# Patient Record
Sex: MALE | NOT HISPANIC OR LATINO | URBAN - METROPOLITAN AREA
[De-identification: names, ages, dates, MRNs, and addresses within clinical notes are randomized per-mention and may not be internally consistent; named-entity substitution may affect disease eponyms.]

---

## 2024-01-10 ENCOUNTER — OFFICE VISIT (OUTPATIENT)
Dept: URGENT CARE | Facility: CLINIC | Age: 39
End: 2024-01-10
Payer: COMMERCIAL

## 2024-01-10 VITALS
RESPIRATION RATE: 16 BRPM | HEART RATE: 102 BPM | WEIGHT: 240 LBS | OXYGEN SATURATION: 97 % | DIASTOLIC BLOOD PRESSURE: 78 MMHG | SYSTOLIC BLOOD PRESSURE: 118 MMHG | BODY MASS INDEX: 30.81 KG/M2 | TEMPERATURE: 98.2 F

## 2024-01-10 DIAGNOSIS — B34.9 VIRAL ILLNESS: ICD-10-CM

## 2024-01-10 DIAGNOSIS — J10.1 INFLUENZA A: Primary | ICD-10-CM

## 2024-01-10 LAB
POC TRIPLEX FLU A-AG: ABNORMAL
POC TRIPLEX FLU B-AG: ABNORMAL
POC TRIPLEX SARSCOV-2 AG: ABNORMAL

## 2024-01-10 PROCEDURE — 99203 OFFICE O/P NEW LOW 30 MIN: CPT | Performed by: PHYSICIAN ASSISTANT

## 2024-01-10 PROCEDURE — 87428 SARSCOV & INF VIR A&B AG IA: CPT | Performed by: PHYSICIAN ASSISTANT

## 2024-01-10 PROCEDURE — 1036F TOBACCO NON-USER: CPT | Performed by: PHYSICIAN ASSISTANT

## 2024-01-10 RX ORDER — OSELTAMIVIR PHOSPHATE 75 MG/1
75 CAPSULE ORAL 2 TIMES DAILY
Qty: 10 CAPSULE | Refills: 0 | Status: SHIPPED | OUTPATIENT
Start: 2024-01-10 | End: 2024-01-15

## 2024-01-10 RX ORDER — BENZONATATE 100 MG/1
100 CAPSULE ORAL 3 TIMES DAILY PRN
Qty: 30 CAPSULE | Refills: 0 | Status: SHIPPED | OUTPATIENT
Start: 2024-01-10 | End: 2024-01-20

## 2024-01-10 ASSESSMENT — PAIN SCALES - GENERAL: PAINLEVEL: 0-NO PAIN

## 2024-01-10 NOTE — LETTER
January 10, 2024     Patient: Hayde Callejas   YOB: 1985   Date of Visit: 1/10/2024       To Whom It May Concern:    It is my medical opinion that Hayde Callejas may return to work on 15 January 2024 .    If you have any questions or concerns, please don't hesitate to call.         Sincerely,        Christopher Rivero PA-C    CC: No Recipients

## 2024-01-11 PROBLEM — J10.1 INFLUENZA A: Status: ACTIVE | Noted: 2024-01-11

## 2024-01-11 ASSESSMENT — ENCOUNTER SYMPTOMS
FATIGUE: 1
CHILLS: 1
FLU SYMPTOMS: 1
COUGH: 1
FEVER: 1
MYALGIAS: 1

## 2024-01-11 NOTE — PROGRESS NOTES
Subjective   Patient ID: Hayde Callejas is a 38 y.o. male.    Patient is a 38-year-old male who complains of fever, chills and myalgia that he has been experiencing for the past 2 days.  Patient describes mild congestion but otherwise denies sinus pain or pressure as well as ear pain or sore throat.  Patient does have a dry, nonproductive cough which he states is worse at night.  Patient states that other family members at home are asymptomatic and in good health.      Flu Symptoms  Presenting symptoms: cough, fatigue, fever and myalgias    Associated symptoms: chills    The following portions of the chart were reviewed this encounter and updated as appropriate:       Review of Systems   Constitutional:  Positive for chills, fatigue and fever.   Respiratory:  Positive for cough.    Musculoskeletal:  Positive for myalgias.   All other systems reviewed and are negative.  Objective   Physical Exam  Vitals and nursing note reviewed.   Constitutional:       Appearance: Normal appearance. He is normal weight.   HENT:      Head: Normocephalic and atraumatic.      Right Ear: Tympanic membrane, ear canal and external ear normal.      Left Ear: Tympanic membrane, ear canal and external ear normal.      Nose: Nose normal. No congestion or rhinorrhea.      Mouth/Throat:      Mouth: Mucous membranes are moist.      Pharynx: Oropharynx is clear. No oropharyngeal exudate or posterior oropharyngeal erythema.   Eyes:      Extraocular Movements: Extraocular movements intact.      Conjunctiva/sclera: Conjunctivae normal.      Pupils: Pupils are equal, round, and reactive to light.   Cardiovascular:      Rate and Rhythm: Normal rate and regular rhythm.      Pulses: Normal pulses.      Heart sounds: Normal heart sounds.   Pulmonary:      Effort: Pulmonary effort is normal. No respiratory distress.      Breath sounds: Normal breath sounds. No stridor. No wheezing, rhonchi or rales.   Musculoskeletal:      Cervical back: Normal range  of motion and neck supple.   Skin:     General: Skin is warm and dry.      Capillary Refill: Capillary refill takes less than 2 seconds.   Neurological:      General: No focal deficit present.      Mental Status: He is alert and oriented to person, place, and time.   Psychiatric:         Mood and Affect: Mood normal.         Behavior: Behavior normal.         Thought Content: Thought content normal.         Judgment: Judgment normal.     Assessment/Plan   Physical exam findings as noted above.  Triplex Ag was obtained and influenza A/B is positive for influenza Type A.  SARS-CoV-2 is negative.  Patient was provided with prescriptions for Tamiflu 75 mg and Tessalon 100 mg.  Supportive care instructions were discussed and the patient verbalizes good understanding of same.    CLINICAL IMPRESSION:  Influenza Type A    Diagnoses and all orders for this visit:  Influenza A  -     benzonatate (Tessalon Perles) 100 mg capsule; Take 1 capsule (100 mg) by mouth 3 times a day as needed for cough for up to 10 days.  -     oseltamivir (Tamiflu) 75 mg capsule; Take 1 capsule (75 mg) by mouth 2 times a day for 5 days.  Viral illness  -     POCT BD Veritor Triplex Ag manually resulted